# Patient Record
Sex: MALE | Race: OTHER | HISPANIC OR LATINO | URBAN - METROPOLITAN AREA
[De-identification: names, ages, dates, MRNs, and addresses within clinical notes are randomized per-mention and may not be internally consistent; named-entity substitution may affect disease eponyms.]

---

## 2020-11-03 ENCOUNTER — EMERGENCY (EMERGENCY)
Facility: HOSPITAL | Age: 55
LOS: 1 days | Discharge: HOME | End: 2020-11-03
Admitting: EMERGENCY MEDICINE
Payer: OTHER MISCELLANEOUS

## 2020-11-03 VITALS
RESPIRATION RATE: 16 BRPM | HEART RATE: 75 BPM | OXYGEN SATURATION: 98 % | TEMPERATURE: 98 F | SYSTOLIC BLOOD PRESSURE: 165 MMHG | DIASTOLIC BLOOD PRESSURE: 85 MMHG

## 2020-11-03 PROCEDURE — 99053 MED SERV 10PM-8AM 24 HR FAC: CPT

## 2020-11-03 PROCEDURE — 99283 EMERGENCY DEPT VISIT LOW MDM: CPT

## 2020-11-03 NOTE — ED ADULT TRIAGE NOTE - CHIEF COMPLAINT QUOTE
pt c/o R toe pain s/p "hyperextending it," + sensation/movement, arrives in foot brace, ambulating in triage with cane because of injury

## 2020-11-04 PROCEDURE — 73630 X-RAY EXAM OF FOOT: CPT | Mod: 26,RT

## 2020-11-04 RX ORDER — IBUPROFEN 200 MG
600 TABLET ORAL ONCE
Refills: 0 | Status: COMPLETED | OUTPATIENT
Start: 2020-11-04 | End: 2020-11-04

## 2020-11-04 NOTE — ED ADULT NURSE NOTE - OBJECTIVE STATEMENT
received pt to intake aox3 in no apparent distress VSS c/o injury to R foot. Pt states was reaching for object when he hyperextended his toes. Pt able to ambulate denies pain at this time but concerned about possible fracture. Pt denies pain/injury to other site. Xray completed awaiting podiatry will continue to monitor

## 2020-11-04 NOTE — ED PROVIDER NOTE - NSFOLLOWUPINSTRUCTIONS_ED_ALL_ED_FT
Please take Ibuprofen 600mg every 6 hours.  Follow with Podiatry next week. See referral attached. Call to make an appointment.  If you have any new, worsened or concerning symptoms, please return to the emergency department immediately.

## 2020-11-04 NOTE — CONSULT NOTE ADULT - SUBJECTIVE AND OBJECTIVE BOX
Podiatry pager #: [phone] ([place])/ [phone] ([facility])    Patient is a 55y old  Male who presents with a chief complaint of right foot injury    HPI:     54 y/o male with no pertinent PMHx presents to the ED with c/o right first and fourth toe pain. Pt states he was reaching for something and put more pressure onto his right leg and believes he hyperextended his right toes. Pt denies any pain at this time. but is concern he might have broken it. Pt also denies any weakness, numbness, tingling sensation, or any other complaints.  PAST MEDICAL & SURGICAL HISTORY:  No pertinent past medical history        MEDICATIONS  (STANDING):    MEDICATIONS  (PRN):      Allergies    No Known Allergies    Intolerances        VITALS:    Vital Signs Last 24 Hrs  T(C): 36.7 (03 Nov 2020 23:42), Max: 36.7 (03 Nov 2020 23:42)  T(F): 98 (03 Nov 2020 23:42), Max: 98 (03 Nov 2020 23:42)  HR: 75 (03 Nov 2020 23:42) (75 - 75)  BP: 165/85 (03 Nov 2020 23:42) (165/85 - 165/85)  BP(mean): --  RR: 16 (03 Nov 2020 23:42) (16 - 16)  SpO2: 98% (03 Nov 2020 23:42) (98% - 98%)    LABS:                CAPILLARY BLOOD GLUCOSE              LOWER EXTREMITY PHYSICAL EXAM:    Vascular: DP/PT 2/4 B/L, CFT <3 seconds B/L, Temperature gradient WNL B/L  Neuro: Epicritic sensation intact to the level of forefoot B/L  Musculoskeletal/Ortho: mild tenderness to palpation to the right 4th digit proximal phalanx base, no tenderness to palpation to right 1st met  Skin: no open fracture, mild ecchymosis to the right 4th digit, neurovascular intact    RADIOLOGY & ADDITIONAL STUDIES:    < from: Xray Foot AP + Lateral + Oblique, Right (11.04.20 @ 00:34) >    ******PRELIMINARY REPORT******    ******PRELIMINARY REPORT******            EXAM:  RAD FOOT MIN 3 VIEWS RIGHT        PROCEDURE DATE:  Nov 4 2020     ******PRELIMINARY REPORT******    ******PRELIMINARY REPORT******            INTERPRETATION:  Tiny minimally avulsed bone fragment at the lateral aspect of the distal first metatarsal.  Acute transverse fracture of the proximal metaphysis of the fourth proximal phalanx.    Follow up official report in AM.          ******PRELIMINARY REPORT******    ******PRELIMINARY REPORT******          GAVI CHAVEZ MD; Resident Radiology    < end of copied text >

## 2020-11-04 NOTE — ED PROVIDER NOTE - LOWER EXTREMITY EXAM, RIGHT
Right foot pulses intact no motor sensory deficits Cap refill of all toes less than 2 sec, and able to wiggle all toes.

## 2020-11-04 NOTE — ED PROVIDER NOTE - OBJECTIVE STATEMENT
54 y/o male with no pertinent PMHx presents to the ED with c/o right toe pain. Pt states he was reaching for something and put more pressure onto his right leg and believes he hyperextended his right toe. Pt denies any pain at this time. but is concern he might have broken it. Pt also denies any weakness, numbness, tingling sensation, or any other complaints. 56 y/o male with no pertinent PMHx presents to the ED with c/o right first and fourth toe pain. Pt states he was reaching for something and put more pressure onto his right leg and believes he hyperextended his right toes. Pt denies any pain at this time. but is concern he might have broken it. Pt also denies any weakness, numbness, tingling sensation, or any other complaints.

## 2020-11-04 NOTE — ED PROVIDER NOTE - PROGRESS NOTE DETAILS
GINETTE Bradshaw: xray shows "Tiny minimally avulsed bone fragment at the lateral aspect of the distal first metatarsal. Acute transverse fracture of the proximal metaphysis of the fourth proximal phalanx." Podiatry consulted. recs appreciated.

## 2020-11-04 NOTE — ED PROVIDER NOTE - CARE PLAN
Principal Discharge DX:	Closed nondisplaced fracture of phalanx of toe of right foot, unspecified toe, initial encounter

## 2020-11-04 NOTE — ED PROVIDER NOTE - PATIENT PORTAL LINK FT
You can access the FollowMyHealth Patient Portal offered by [facility] by registering at the following website: [email or web address]. By joining [facility]’s FollowMyHealth portal, you will also be able to view your health information using other applications (apps) compatible with our system.

## 2020-11-04 NOTE — ED PROVIDER NOTE - CLINICAL SUMMARY MEDICAL DECISION MAKING FREE TEXT BOX
54 y/o male with no pertinent PMHx presents to the ED with c/o right toe pain. Benign exam with no clinical evidence of neurovascular compromise. Impression right toe sprain will rule out acute fracture; plan for XR, Pt does not want pain medication at this time, and reassess. 56 y/o male with no pertinent PMHx presents to the ED with c/o right first and fourth toe pain. Benign exam with no clinical evidence of neurovascular compromise. Impression right toe sprain will rule out acute fracture; plan for XR, Pt does not want pain medication at this time, and reassess.

## 2020-11-04 NOTE — CONSULT NOTE ADULT - ASSESSMENT
54 yo m with right foot 4th digit proximal phalanx fracture w mild displacement    -pt seen and evaluated  -vital signs stable, neurovascular intact  -RF xray: Acute transverse fracture of the fourth digit proximal phalanx.  -Mild tenderness to palpation to the right 4th digit proximal phalanx base, no tenderness to palpation to right 1st met, no open fracture, mild ecchymosis to the right 4th digit, neurovascular intact  -applied betadine alexander splint to right 3rd,4th digits, Harvey compression to the right foot  -instructed pt to heel WB to right foot in surgical shoe  -pod stable for discharge to follow up within 1 week w Dr. Tenorio office: [phone]  -discussed with attending

## 2021-07-10 ENCOUNTER — EMERGENCY (EMERGENCY)
Facility: HOSPITAL | Age: 56
LOS: 1 days | Discharge: LEFT BEFORE DISCHARGE | End: 2021-07-10
Admitting: EMERGENCY MEDICINE
Payer: OTHER MISCELLANEOUS

## 2021-07-10 VITALS
SYSTOLIC BLOOD PRESSURE: 132 MMHG | RESPIRATION RATE: 18 BRPM | TEMPERATURE: 98 F | OXYGEN SATURATION: 100 % | HEART RATE: 60 BPM | DIASTOLIC BLOOD PRESSURE: 81 MMHG

## 2021-07-10 PROCEDURE — L9991: CPT
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
## 2021-07-11 ENCOUNTER — EMERGENCY (EMERGENCY)
Facility: HOSPITAL | Age: 56
LOS: 1 days | Discharge: HOME | End: 2021-07-11
Attending: EMERGENCY MEDICINE | Admitting: EMERGENCY MEDICINE
Payer: COMMERCIAL

## 2021-07-11 VITALS
DIASTOLIC BLOOD PRESSURE: 71 MMHG | RESPIRATION RATE: 12 BRPM | TEMPERATURE: 98 F | SYSTOLIC BLOOD PRESSURE: 129 MMHG | HEART RATE: 68 BPM | OXYGEN SATURATION: 100 %

## 2021-07-11 PROBLEM — Z78.9 OTHER SPECIFIED HEALTH STATUS: Chronic | Status: ACTIVE | Noted: 2020-11-04

## 2021-07-11 PROCEDURE — 99284 EMERGENCY DEPT VISIT MOD MDM: CPT

## 2021-07-11 RX ORDER — LIDOCAINE 4 G/100G
1 CREAM TOPICAL ONCE
Refills: 0 | Status: COMPLETED | OUTPATIENT
Start: 2021-07-11 | End: 2021-07-11

## 2021-07-11 RX ORDER — ACETAMINOPHEN 500 MG
975 TABLET ORAL ONCE
Refills: 0 | Status: COMPLETED | OUTPATIENT
Start: 2021-07-11 | End: 2021-07-11

## 2021-07-11 RX ORDER — IBUPROFEN 200 MG
600 TABLET ORAL ONCE
Refills: 0 | Status: COMPLETED | OUTPATIENT
Start: 2021-07-11 | End: 2021-07-11

## 2021-07-11 RX ADMIN — Medication 600 MILLIGRAM(S): at 15:01

## 2021-07-11 RX ADMIN — Medication 975 MILLIGRAM(S): at 15:02

## 2021-07-11 RX ADMIN — LIDOCAINE 1 PATCH: 4 CREAM TOPICAL at 15:01
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
## 2021-07-11 NOTE — ED PROVIDER NOTE - NS ED ROS FT
Constitutional:  (-) fever, (-) chills, (-) lethargy  Eyes:  (-) eye pain (-) visual changes  ENMT: (-) nasal discharge, (-) sore throat. (-) neck pain or stiffness  Cardiac: (-) chest pain (-) palpitations  Respiratory:  (-) cough (-) respiratory distress.   GI:  (-) nausea (-) vomiting (-) diarrhea (-) abdominal pain.  :  (-) dysuria (-) frequency (-) burning.  MS:  (+) back pain (+) joint pain.  Neuro:  (-) headache (-) numbness (-) tingling (-) focal weakness  Skin:  (-) rash  Except as documented in the HPI,  all other systems are negative
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
## 2021-07-11 NOTE — ED PROVIDER NOTE - PATIENT PORTAL LINK FT
You can access the FollowMyHealth Patient Portal offered by [facility] by registering at the following website: [email or web address]. By joining [employer]’s FollowMyHealth portal, you will also be able to view your health information using other applications (apps) compatible with our system.
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
## 2021-07-11 NOTE — ED PROVIDER NOTE - PHYSICAL EXAMINATION
CONSTITUTIONAL: well-appearing, in NAD  SKIN: Warm dry, normal skin turgor  HEAD: NCAT  EYES: EOMI, PERRLA, no scleral icterus, conjunctiva pink  ENT: normal pharynx with no erythema or exudates  NECK: Supple; non tender. Full ROM, paravertebral tenderness, no bony tenderness  CARD: RRR, no murmurs.  RESP: clear to ausculation b/l. No crackles or wheezing.  ABD: soft, non-tender, non-distended, no rebound or guarding.  EXT: Full ROM, no bony tenderness over T-L-C spine, no pedal edema, no calf tenderness  NEURO: normal motor. normal sensory. CN II-XII intact. Cerebellar testing normal. Normal gait.  PSYCH: Cooperative, appropriate.

## 2024-06-03 ENCOUNTER — APPOINTMENT (OUTPATIENT)
Dept: ORTHOPEDIC SURGERY | Facility: CLINIC | Age: 59
End: 2024-06-03
Payer: COMMERCIAL

## 2024-06-03 VITALS
DIASTOLIC BLOOD PRESSURE: 84 MMHG | SYSTOLIC BLOOD PRESSURE: 131 MMHG | BODY MASS INDEX: 29.1 KG/M2 | WEIGHT: 192 LBS | HEART RATE: 81 BPM | HEIGHT: 68 IN

## 2024-06-03 DIAGNOSIS — M22.2X2 PATELLOFEMORAL DISORDERS, LEFT KNEE: ICD-10-CM

## 2024-06-03 DIAGNOSIS — M25.562 PAIN IN LEFT KNEE: ICD-10-CM

## 2024-06-03 PROCEDURE — 99203 OFFICE O/P NEW LOW 30 MIN: CPT

## 2024-06-03 PROCEDURE — 73564 X-RAY EXAM KNEE 4 OR MORE: CPT | Mod: LT

## 2024-06-03 RX ORDER — MELOXICAM 15 MG/1
15 TABLET ORAL DAILY
Qty: 30 | Refills: 0 | Status: ACTIVE | COMMUNITY
Start: 2024-06-03 | End: 1900-01-01

## 2024-06-03 NOTE — PHYSICAL EXAM
[de-identified] : The patient appears well nourished  and in no apparent distress.  The patient is alert and oriented to person, place, and time.   Affect and mood appear normal.    The head is normocephalic and atraumatic.  The eyes reveal normal sclera and extra ocular muscles are intact.   The neck appears normal with no jugular venous distention or masses noted.   Skin shows normal turgor with no evidence of eczema or psoriasis.  No respiratory distress noted.  The patient ambulates with a normal gait.  The left knee has normal range of motion.  There is mild discomfort with terminal of motion localized to the patellofemoral joint.  There is a mild effusion.  There is a negative [facility] sign.  There is no soft tissue swelling, warmth, or erythema.   There is a negative Lachman sign.  There is no instability to varus/valgus stress.  There is no instability to anterior/posterior drawer.  There is normal strength  in the quadriceps and hamstring muscles.  Strength and sensation are intact distally.  There are normal pulses distally and good capillary refill.  No edema or lymphadenopathy noted.   [de-identified] : AP, lateral, tunnel, and merchant views of the left knee were obtained.  There is patella pratibha.  The joint spaces are well maintained without evidence of degenerative arthritis. The alignment of the knee is normal.  No fractures or dislocations are noted.

## 2024-06-03 NOTE — HISTORY OF PRESENT ILLNESS
[de-identified] : This patient presents for evaluation of her complaints of left knee pain.  The pain started a few years ago and has gotten worse over the last few months.  He notes pain worse with prolonged standing ambulation and stair climbing.  When the pain bothers him it is sharp in nature and 8 out of 10 with certain movements.  Pain is improved with rest.    Patient takes tumeric for the pain.  Has been using an elastic knee sleeve for support.

## 2024-06-03 NOTE — DISCUSSION/SUMMARY
[de-identified] : Patient presents today for evaluation regarding complaints of left knee pain.  He said the pain which has been worse over the last few months but has started about 2 years ago.  His physical exam and x-rays is evidence of patellofemoral syndrome of the knee.  I recommend a course of meloxicam 15 mg each day for the next 2 weeks.  Also recommend a course of physical therapy.  He will attend therapy twice a week for the next month.  I would like to see him back in 1 month for follow-up and reevaluation.  If symptoms do not improve I recommend MRI of his knee at that time.  I recommended a course of Mobic, 15mg per day for the next 2 weeks.  The patient was given a prescription for the Mobic with directions to take it once daily with the first meal of the day.  They were instructed to stop the medicine and call the office if there are any adverse reactions to the medicine.  At least 30 minutes was spent performing the evaluation and management on today's office visit.  This includes but is not limited to preparing to see patient including review of any test results or outside medical records, obtaining and/or reviewing separately obtained history, performing examination and evaluation, counseling and educating the patient on their diagnosis and treatment recommendations, ordering medications, tests, or procedures, documenting clinical information in the electronic health record, independently interpreting results (not separately reported) and communicating results to the patient, and coordination of care.

## 2024-06-03 NOTE — REASON FOR VISIT
[Initial Visit] : an initial visit for [Knee Pain] : knee pain [Spouse] : spouse [FreeTextEntry2] : Patient presents for evaluation of left knee pain